# Patient Record
(demographics unavailable — no encounter records)

---

## 2025-07-01 NOTE — ASSESSMENT
[FreeTextEntry1] : Ana Maria Obregon is an 84 year old female with a PMH of tobacco use and hypothyroidism who was admitted to [facility] after presenting with generalized weakness and falls. MRI confirmed R caudate subacute infarct. CTA head/neck negative. MIGUEL w/o significant findings; ?  Lambl's excresence- will obtain CRP/ESR, blood cultures to better r/o IE, but low suspicion. TTE pending. Pending ILR. Continue DAPT, statin.  Will need OP CT chest/PET. D/C to [facility] today.    New medications on discharge: aspirin and plavix for 21 days total, 17 more days of both, last day of plavix is 6/15/25. Afterwards, discontinue plavix, continue aspirin 81mg daily. atorvastatin 80mg daily, levothyroxine 200mcg daily, nifedipine 60mg Labs to be followed up: blood cultures Imaging to be done as outpatient: Will need followup outpatient PET/CT chest for groundglass opacities in the upper lungs Further outpatient workup: stroke, EP

## 2025-07-01 NOTE — HISTORY OF PRESENT ILLNESS
[FreeTextEntry1] : Ana Maria Woodruff is an 85yo L handed female, poor historian, former smoker (quit 1 year ago, used to smoke 2 PPD) with PMHx of hypothyroidism (on Synthroid) presenting to [facility] ED on 5/24 s/p mechanical fall day prior. Patient states she tripped and fell around 6pm on Friday 5/23 and was unable to get up after that. States she tripped with her feet and did not experience any syncope, dizziness, lightheadedness, visual disturbances, sudden weakness prior to fall. Home aide found her next morning lying on the floor and soaked in urine. + head strike, no LOC, no AC or AP use. Per home aide, patient normally walks with cane at baseline, [id number]. Patient has been noticed to appear weaker over the past 6 weeks and has been falling frequently. Currently endorsing L hip, knee, hand and elbow pain. Patient obtained CTH in ED as part of workup s/p fall, was found to have subacute-appearing infarct of the right caudate nucleus and left parietal scalp laceration, bleeding now stopped. Stroke team consulted for evaluation of new CTH findings, will admit to stroke tele for further workup. NIHSS 0. MRI confirmed acute R caudate infarct. ILR placed 5/30. 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     Hospital Course May 24-30, 2025, Seen by Dr. Rodgers In this hospital course, patient had acute R caudate infarct as seen on the MRI. Stroke etiology most likely due to small vessel disease.  Imaging: CT Head: Left parietal scalp injury. No acute intracranial hemorrhage or mass effect. Subacute-appearing infarct of the right caudate nucleus. Correlate for any recent left-sided neurologic deficit. MR Head Non Contrast: ACUTE RIGHT CAUDATE INFARCT. NO ACUTE INTRACRANIAL HEMORRHAGE. CTA NECK: No evidence of significant stenosis or occlusion. Scattered groundglass Opacities in the upper lungs likely infectious or inflammatory. Follow-up PET/CT can be obtained in 3 months. CTA HEAD: No large vessel occlusion, significant stenosis or vascular abnormality identified.  MIGUEL:  1. Left ventricular systolic function is normal with an ejection fraction visually estimated at 55 to 60 %.  2. Normal right ventricular cavity size and normal right ventricular systolic function.  3. No thrombus seen in the left atrium, left atrial appendage, right atrium. or right atrial appendage.  4. Agitated saline injection reveals bubbles in the left heart, consistent with a pulmonary arteriovenous malformation.  5. No evidence of an intracardiac shunt.  6. Fibrocalcific aortic valve sclerosis without stenosis.  7. There are thin mobile echoes on the aortic valve leaflets most consistent with Jean's excresences. In the presence of bacteremia, a small vegetation cannot be excluded (less likely).  8. There is prolapse of the posterior mitral leaflet, (posteromedial scallop (P3) and middle scallop (P2) scallop). There is mild mitral regurgitation.  9. No pericardial effusion seen. 10. Severe non-mobile atheroma in the visualized portions of the descending aorta.  TTE:   1. Left ventricular cavity is normal in size. Left ventricular systolic function is normal with an ejection fraction of 58 % by Ahuja's method of disks.  2. Mild left ventricular hypertrophy.  3. Normal right ventricular systolic function. Tricuspid annular plane systolic excursion (TAPSE) is 1.7 cm (normal >=1.7 cm).  4. Left atrium is mildly dilated.  5. There is calcification of the mitral valve annulus.  6. Mild mitral valve leaflet calcification. 7. Mild to moderate mitral regurgitation.  8. Fibrocalcific aortic valve sclerosis without stenosis.  9. Trace aortic regurgitation. 10. Estimated pulmonary artery systolic pressure is 28 mmHg. 11. No pericardial effusion seen.